# Patient Record
Sex: MALE | Race: WHITE | NOT HISPANIC OR LATINO | Employment: FULL TIME | URBAN - METROPOLITAN AREA
[De-identification: names, ages, dates, MRNs, and addresses within clinical notes are randomized per-mention and may not be internally consistent; named-entity substitution may affect disease eponyms.]

---

## 2022-10-11 ENCOUNTER — HOSPITAL ENCOUNTER (EMERGENCY)
Facility: CLINIC | Age: 52
Discharge: HOME | End: 2022-10-11
Attending: EMERGENCY MEDICINE | Admitting: EMERGENCY MEDICINE
Payer: COMMERCIAL

## 2022-10-11 VITALS
SYSTOLIC BLOOD PRESSURE: 118 MMHG | RESPIRATION RATE: 18 BRPM | HEART RATE: 83 BPM | TEMPERATURE: 97.7 F | DIASTOLIC BLOOD PRESSURE: 74 MMHG | OXYGEN SATURATION: 95 %

## 2022-10-11 DIAGNOSIS — F10.10 ALCOHOL ABUSE: ICD-10-CM

## 2022-10-11 LAB
ALBUMIN SERPL-MCNC: 3.4 G/DL (ref 3.4–5)
ALCOHOL BREATH TEST: 0.26 (ref 0–0.01)
ALP SERPL-CCNC: 102 U/L (ref 40–150)
ALT SERPL W P-5'-P-CCNC: 117 U/L (ref 0–70)
ANION GAP SERPL CALCULATED.3IONS-SCNC: 8 MMOL/L (ref 3–14)
AST SERPL W P-5'-P-CCNC: 189 U/L (ref 0–45)
BASOPHILS # BLD AUTO: 0 10E3/UL (ref 0–0.2)
BASOPHILS NFR BLD AUTO: 1 %
BILIRUB SERPL-MCNC: 0.5 MG/DL (ref 0.2–1.3)
BUN SERPL-MCNC: 10 MG/DL (ref 7–30)
CALCIUM SERPL-MCNC: 8 MG/DL (ref 8.5–10.1)
CHLORIDE BLD-SCNC: 103 MMOL/L (ref 94–109)
CO2 SERPL-SCNC: 25 MMOL/L (ref 20–32)
CREAT SERPL-MCNC: 0.7 MG/DL (ref 0.66–1.25)
EOSINOPHIL # BLD AUTO: 0 10E3/UL (ref 0–0.7)
EOSINOPHIL NFR BLD AUTO: 1 %
ERYTHROCYTE [DISTWIDTH] IN BLOOD BY AUTOMATED COUNT: 12.4 % (ref 10–15)
GFR SERPL CREATININE-BSD FRML MDRD: >90 ML/MIN/1.73M2
GLUCOSE BLD-MCNC: 99 MG/DL (ref 70–99)
HCT VFR BLD AUTO: 43.6 % (ref 40–53)
HGB BLD-MCNC: 15.2 G/DL (ref 13.3–17.7)
IMM GRANULOCYTES # BLD: 0 10E3/UL
IMM GRANULOCYTES NFR BLD: 0 %
LYMPHOCYTES # BLD AUTO: 1.7 10E3/UL (ref 0.8–5.3)
LYMPHOCYTES NFR BLD AUTO: 44 %
MCH RBC QN AUTO: 34 PG (ref 26.5–33)
MCHC RBC AUTO-ENTMCNC: 34.9 G/DL (ref 31.5–36.5)
MCV RBC AUTO: 98 FL (ref 78–100)
MONOCYTES # BLD AUTO: 0.4 10E3/UL (ref 0–1.3)
MONOCYTES NFR BLD AUTO: 10 %
NEUTROPHILS # BLD AUTO: 1.7 10E3/UL (ref 1.6–8.3)
NEUTROPHILS NFR BLD AUTO: 44 %
NRBC # BLD AUTO: 0 10E3/UL
NRBC BLD AUTO-RTO: 0 /100
PLATELET # BLD AUTO: 158 10E3/UL (ref 150–450)
POTASSIUM BLD-SCNC: 3.2 MMOL/L (ref 3.4–5.3)
PROT SERPL-MCNC: 7 G/DL (ref 6.8–8.8)
RBC # BLD AUTO: 4.47 10E6/UL (ref 4.4–5.9)
SARS-COV-2 RNA RESP QL NAA+PROBE: NEGATIVE
SODIUM SERPL-SCNC: 136 MMOL/L (ref 133–144)
WBC # BLD AUTO: 3.9 10E3/UL (ref 4–11)

## 2022-10-11 PROCEDURE — C9803 HOPD COVID-19 SPEC COLLECT: HCPCS | Performed by: EMERGENCY MEDICINE

## 2022-10-11 PROCEDURE — 99283 EMERGENCY DEPT VISIT LOW MDM: CPT | Performed by: EMERGENCY MEDICINE

## 2022-10-11 PROCEDURE — 80053 COMPREHEN METABOLIC PANEL: CPT | Performed by: EMERGENCY MEDICINE

## 2022-10-11 PROCEDURE — 36415 COLL VENOUS BLD VENIPUNCTURE: CPT | Performed by: EMERGENCY MEDICINE

## 2022-10-11 PROCEDURE — U0003 INFECTIOUS AGENT DETECTION BY NUCLEIC ACID (DNA OR RNA); SEVERE ACUTE RESPIRATORY SYNDROME CORONAVIRUS 2 (SARS-COV-2) (CORONAVIRUS DISEASE [COVID-19]), AMPLIFIED PROBE TECHNIQUE, MAKING USE OF HIGH THROUGHPUT TECHNOLOGIES AS DESCRIBED BY CMS-2020-01-R: HCPCS | Performed by: EMERGENCY MEDICINE

## 2022-10-11 PROCEDURE — 250N000013 HC RX MED GY IP 250 OP 250 PS 637: Performed by: EMERGENCY MEDICINE

## 2022-10-11 PROCEDURE — 82075 ASSAY OF BREATH ETHANOL: CPT | Performed by: EMERGENCY MEDICINE

## 2022-10-11 PROCEDURE — 85048 AUTOMATED LEUKOCYTE COUNT: CPT | Performed by: EMERGENCY MEDICINE

## 2022-10-11 PROCEDURE — 99284 EMERGENCY DEPT VISIT MOD MDM: CPT | Performed by: EMERGENCY MEDICINE

## 2022-10-11 RX ORDER — DIAZEPAM 5 MG
5-20 TABLET ORAL EVERY 30 MIN PRN
Status: DISCONTINUED | OUTPATIENT
Start: 2022-10-11 | End: 2022-10-11 | Stop reason: HOSPADM

## 2022-10-11 RX ORDER — POTASSIUM CHLORIDE 750 MG/1
40 TABLET, EXTENDED RELEASE ORAL ONCE
Status: COMPLETED | OUTPATIENT
Start: 2022-10-11 | End: 2022-10-11

## 2022-10-11 RX ORDER — FOLIC ACID 1 MG/1
1 TABLET ORAL ONCE
Status: COMPLETED | OUTPATIENT
Start: 2022-10-11 | End: 2022-10-11

## 2022-10-11 RX ORDER — MULTIPLE VITAMINS W/ MINERALS TAB 9MG-400MCG
1 TAB ORAL ONCE
Status: COMPLETED | OUTPATIENT
Start: 2022-10-11 | End: 2022-10-11

## 2022-10-11 RX ADMIN — FOLIC ACID 1 MG: 1 TABLET ORAL at 08:49

## 2022-10-11 RX ADMIN — THIAMINE HCL TAB 100 MG 100 MG: 100 TAB at 08:49

## 2022-10-11 RX ADMIN — POTASSIUM CHLORIDE 40 MEQ: 750 TABLET, EXTENDED RELEASE ORAL at 12:02

## 2022-10-11 RX ADMIN — MULTIPLE VITAMINS W/ MINERALS TAB 1 TABLET: TAB at 08:49

## 2022-10-11 ASSESSMENT — ENCOUNTER SYMPTOMS
ABDOMINAL PAIN: 0
VOMITING: 0
NAUSEA: 0

## 2022-10-11 ASSESSMENT — ACTIVITIES OF DAILY LIVING (ADL)
ADLS_ACUITY_SCORE: 35

## 2022-10-11 NOTE — DISCHARGE INSTRUCTIONS
DISCHARGE RESOURCES:  -SMART Recovery - self management for addiction recovery:  www.smartrecovery.org    -Pathways ~ A Health Crisis Resource & Support Center: 184.382.4738.  -[facility] [phone]   -[facility] Behavioral Intake [phone] or 360-020-9175.  -Crisis Intervention: 658.693.4783 or 409-753-5060 (TTY: 479.757.7248).  Call anytime.  -Suicide Awareness Voices of Education (SAVE) (www.save.org): 528-238-QEFW (1519)  -National Suicide Prevention Line (www.mentalhealthmn.org): 111-375-VECD (6754)  -National [place] on Mental Illness (www.mn.naresh.org): 394.520.6058 or 613-558-0560.  -Gbqk9aqpu: text the word LIFE to 83655 for immediate support and crisis intervention  -Mental Health Consumer/Survivor Network of [place] (www.mhcsn.net): 802.784.1289 or 520-952-1718  -Mental Health Association of [place] (www.mentalhealth.org): 635.690.4859 or 661-018-4872     -Substance Abuse and Mental Health Services (www.samhsa.gov)  -Harm Reduction Coalition (www. Harmreduction.org)  -www.prescribetoprevent.org or http://prescribetoprevent.org/video  -Poison control 4-978-599-1755   -[place] Opioid Prevention Coalition: www.opioidcoalition.org      Sober Support Group Information:  AA/NA & Sponsor/Support  -Alcoholics Anonymous (www.alcoholics-anonymous.org): for local information 24 hours/day  -AA Intergroup service office in [place] (http://www.aastpaul.org/) 408.590.9243  -AA Intergroup service office in [facility]: [phone]. (http://www.a[place].org/)  -Narcotics Anonymous (www.nam[place].org) (753) 644-1512   -Sober Fun Activities: www.soberDindongactivities.[employer]/[facility]//[facility] Recovery Connection (MRC)  [facility] connects people seeking recovery to resources that help foster and sustain long-term recovery.  Whether you are seeking resources for treatment, transportation, housing, job training, education, health care or other pathways to recovery, [facility] is  a great place to start.    Phone: [phone]. www.[place]ade.[employer] (Great listing of all types of recovery and non-recovery related resources)      General Medication Instructions:   See your medication sheet(s) for instructions.   Take all medicines as directed.  Make no changes unless your doctor suggests them.   Go to all your doctor visits.  Be sure to have all your required lab tests. This way, your medicines can be refilled on time.  Do not use any drugs not prescribed by your provider.  AA/NA and Sponsors are excellent resources for support  Avoid alcohol.      Any follow up concerns:  Nursing questions call the [facility] [phone]  Medical Record call [phone]  Outpatient Behavioral Intake call [phone]  LP+ Wait List/Bed Availability call [phone]

## 2022-10-11 NOTE — ED TRIAGE NOTES
"Pt seeking detox. Pt drinks aprox 6 hard seltzers, and a \"few\" hard liquor drinks a day. Last drink last night. No hx seizures with withdrawal. Pt states increase in stress due to recently losing his job, and his father passing away last week.      Triage Assessment     Row Name 10/11/22 0808       Triage Assessment (Adult)    Airway WDL WDL       Respiratory WDL    Respiratory WDL WDL       Skin Circulation/Temperature WDL    Skin Circulation/Temperature WDL WDL       Cardiac WDL    Cardiac WDL WDL       Peripheral/Neurovascular WDL    Peripheral Neurovascular WDL WDL       Cognitive/Neuro/Behavioral WDL    Cognitive/Neuro/Behavioral WDL WDL              "

## 2022-10-11 NOTE — ED PROVIDER NOTES
"    [facility] EMERGENCY DEPARTMENT ([facility])    10/11/22     [facility] 3  10:25 AM   History     Chief Complaint   Patient presents with     Alcohol Intoxication     Pt seeking detox. Pt drinks aprox 6 hard seltzers, and a \"few\" hard liquor drinks a day. Last drink last night. No hx seizures with withdrawal.     The history is provided by medical records and the patient.     Darion Castrejon is a 52 year old male who presents with alcohol intoxication.  He reports drinking 6 hard seltzers and a few hard liquor drinks a day, last drink last night.  No history of alcohol withdrawal seizures.  He notes having multiple stressors including the passing of his father 7 days ago and losing his job. He states his best friend is a social work told him to drink because she wanted to bring him for detox last night. He states he has been drinking alcohol daily for the past 3 years, but has been cutting back from 5-7 days earlier when his father . He drinks 6-7 hard seltzers a day like [place], sometimes \"graduates\" to vodka cocktails. He drank at her insistence. No history of alcohol withdrawal seizures or DTs. He states his sons and work have been telling him that he has a drinking problem and he acknowledges this. His friend has been very concerned about his health and his drinking for years. He notes having multiple resources that he can go to. He states he occasionally has a couple  where he partied hard on weekend and was feeling symptomatic on a Monday. No nausea, vomiting, diaphoresis, tachycardia. Job is in jeopardy because he took time off to be with his father through hospice. Was supposed to go into work today but lost his phone and couldn't call in. He denies any depression, anxiety, suicidal or homicidal ideation. Also uses marijuana. Just wants to be discharged to follow-up with outpatient resources. He is a single parent of 2 children and his job is important to him.  He has been there for 6 " years and has benefits for his kids.       Past Medical History  No past medical history on file.  No past surgical history on file.  No current outpatient medications on file.    No Known Allergies  Family History  No family history on file.  Social History       Past medical history, past surgical history, medications, allergies, family history, and social history were reviewed with the patient. No additional pertinent items.       Review of Systems   Gastrointestinal: Negative for abdominal pain, nausea and vomiting.   Psychiatric/Behavioral: Negative for suicidal ideas.   All other systems reviewed and are negative.    A complete review of systems was performed with pertinent positives and negatives noted in the HPI, and all other systems negative.    Physical Exam   BP: (!) 142/91  Pulse: 89  Temp: 97.7  F (36.5  C)  Resp: 18  SpO2: 98 %  Physical Exam  Vitals and nursing note reviewed.   Constitutional:       General: He is not in acute distress.     Appearance: Normal appearance. He is well-developed and well-nourished. He is not ill-appearing or diaphoretic.   HENT:      Head: Normocephalic and atraumatic.      Nose: Nose normal.      Mouth/Throat:      Mouth: Mucous membranes are moist.   Eyes:      General: No scleral icterus.     Conjunctiva/sclera: Conjunctivae normal.   Cardiovascular:      Rate and Rhythm: Normal rate.   Pulmonary:      Effort: Pulmonary effort is normal. No respiratory distress.      Breath sounds: No stridor.   Abdominal:      General: There is no distension.   Musculoskeletal:         General: No deformity or signs of injury. Normal range of motion.      Cervical back: Normal range of motion and neck supple. No rigidity.   Skin:     General: Skin is warm and dry.      Coloration: Skin is not jaundiced or pale.      Findings: No rash.   Neurological:      General: No focal deficit present.      Mental Status: He is alert and oriented to person, place, and time.      GCS: GCS eye  subscore is 4. GCS verbal subscore is 5. GCS motor subscore is 6.      Cranial Nerves: No dysarthria or facial asymmetry.      Motor: No tremor.      Gait: Gait is intact.   Psychiatric:         Attention and Perception: Attention normal.         Mood and Affect: Mood and affect, mood and affect normal. Mood is not anxious.         Speech: Speech normal. Speech is not slurred.         Behavior: Behavior normal. Behavior is not slowed. Behavior is cooperative.         Thought Content: Thought content normal. Thought content does not include homicidal or suicidal ideation.         ED Course      Procedures                     Results for orders placed or performed during the hospital encounter of 10/11/22   Comprehensive metabolic panel     Status: Abnormal   Result Value Ref Range    Sodium 136 133 - 144 mmol/L    Potassium 3.2 (L) 3.4 - 5.3 mmol/L    Chloride 103 94 - 109 mmol/L    Carbon Dioxide (CO2) 25 20 - 32 mmol/L    Anion Gap 8 3 - 14 mmol/L    Urea Nitrogen 10 7 - 30 mg/dL    Creatinine 0.70 0.66 - 1.25 mg/dL    Calcium 8.0 (L) 8.5 - 10.1 mg/dL    Glucose 99 70 - 99 mg/dL    Alkaline Phosphatase 102 40 - 150 U/L     (H) 0 - 45 U/L     (H) 0 - 70 U/L    Protein Total 7.0 6.8 - 8.8 g/dL    Albumin 3.4 3.4 - 5.0 g/dL    Bilirubin Total 0.5 0.2 - 1.3 mg/dL    GFR Estimate >90 >60 mL/min/1.73m2   Asymptomatic COVID-19 Virus (Coronavirus) by PCR Nasopharyngeal     Status: Normal    Specimen: Nasopharyngeal; Swab   Result Value Ref Range    SARS CoV2 PCR Negative Negative    Narrative    Testing was performed using the Xpert Xpress SARS-CoV-2 Assay on the   [employer]. Additional information about   this Emergency Use Authorization (EUA) assay can be found via the Lab   Guide. This test should be ordered for the detection of SARS-CoV-2 in   individuals who meet SARS-CoV-2 clinical and/or epidemiological   criteria. Test performance is unknown in asymptomatic patients. This    test is for in vitro diagnostic use under the FDA EUA for   laboratories certified under CLIA to perform high complexity testing.   This test has not been FDA cleared or approved. A negative result   does not rule out the presence of PCR inhibitors in the specimen or   target RNA in concentration below the limit of detection for the   assay. The possibility of a false negative should be considered if   the patient's recent exposure or clinical presentation suggests   COVID-19. This test was validated by the [facility]. This laboratory is certified under the Clinical Laboratory Improvement Amendments of 1988 (CLIA-88) as qualified to perform high complexity laboratory testing.     CBC with platelets and differential     Status: Abnormal   Result Value Ref Range    WBC Count 3.9 (L) 4.0 - 11.0 10e3/uL    RBC Count 4.47 4.40 - 5.90 10e6/uL    Hemoglobin 15.2 13.3 - 17.7 g/dL    Hematocrit 43.6 40.0 - 53.0 %    MCV 98 78 - 100 fL    MCH 34.0 (H) 26.5 - 33.0 pg    MCHC 34.9 31.5 - 36.5 g/dL    RDW 12.4 10.0 - 15.0 %    Platelet Count 158 150 - 450 10e3/uL    % Neutrophils 44 %    % Lymphocytes 44 %    % Monocytes 10 %    % Eosinophils 1 %    % Basophils 1 %    % Immature Granulocytes 0 %    NRBCs per 100 WBC 0 <1 /100    Absolute Neutrophils 1.7 1.6 - 8.3 10e3/uL    Absolute Lymphocytes 1.7 0.8 - 5.3 10e3/uL    Absolute Monocytes 0.4 0.0 - 1.3 10e3/uL    Absolute Eosinophils 0.0 0.0 - 0.7 10e3/uL    Absolute Basophils 0.0 0.0 - 0.2 10e3/uL    Absolute Immature Granulocytes 0.0 <=0.4 10e3/uL    Absolute NRBCs 0.0 10e3/uL   Alcohol breath test POCT     Status: Abnormal   Result Value Ref Range    Alcohol Breath Test 0.257 (A) 0.00 - 0.01   CBC with platelets differential     Status: Abnormal    Narrative    The following orders were created for panel order CBC with platelets differential.  Procedure                               Abnormality         Status                     ---------                                -----------         ------                     CBC with platelets and d...[[id number]]  Abnormal            Final result                 Please view results for these tests on the individual orders.   Urine Drugs of Abuse Screen     Status: None ()    Narrative    The following orders were created for panel order Urine Drugs of Abuse Screen.  Procedure                               Abnormality         Status                     ---------                               -----------         ------                     Drug abuse screen 1 urin...[[id number]]                                                   Please view results for these tests on the individual orders.     Medications   thiamine (B-1) tablet 100 mg (100 mg Oral Given 10/11/22 0849)   folic acid (FOLVITE) tablet 1 mg (1 mg Oral Given 10/11/22 0849)   multivitamin w/minerals (THERA-VIT-M) tablet 1 tablet (1 tablet Oral Given 10/11/22 0849)   potassium chloride ER (KLOR-CON M) CR tablet 40 mEq (40 mEq Oral Given 10/11/22 1202)        Assessments & Plan (with Medical Decision Making)   Darion Castrejon is a 52 year old male who presents with alcohol intoxication.     Ddx: Alcohol withdrawal, alcohol abuse, alcohol intoxication acute bereavement    Patient referred to our emergency department by a friend who wanted him to be admitted for alcohol detox.  However, the patient does not have any alcohol withdrawal symptoms when he stops drinking.  He has never had alcohol withdrawal seizure or delirium.  He denies nausea, vomiting, tremor, diaphoresis, anxiety.  He has no suicidal or homicidal ideation and is motivated to stop drinking utilizing outpatient resources for alcohol abuse.  He states that his job offers the treatment program which he has access to and intends to follow through with.  He does not necessarily want to be admitted for detox.  After this discussion, it is apparent he does not meet clinical criteria to be admitted to  our detox unit as he does not have evidence of physical dependence or withdrawal.  I did discuss in detail with the patient that I think it is important for him to utilize his outpatient resources to treat his alcohol abuse and addiction despite lack of physical withdrawal symptoms.  Patient clinically sober and discharged with a sober ride.    I have reviewed the nursing notes. I have reviewed the findings, diagnosis, plan and need for follow up with the patient.    There are no discharge medications for this patient.      Final diagnoses:   Alcohol abuse       --  Monika SANTOS [facility] EMERGENCY DEPARTMENT  10/11/2022     Monika Peng MD  10/11/22 7021

## 2023-08-04 ENCOUNTER — HOSPITAL ENCOUNTER (INPATIENT)
Facility: CLINIC | Age: 53
LOS: 3 days | Discharge: HOME | End: 2023-08-07
Attending: STUDENT IN AN ORGANIZED HEALTH CARE EDUCATION/TRAINING PROGRAM | Admitting: PSYCHIATRY & NEUROLOGY
Payer: MEDICAID

## 2023-08-04 DIAGNOSIS — Z11.52 ENCOUNTER FOR SCREENING LABORATORY TESTING FOR SEVERE ACUTE RESPIRATORY SYNDROME CORONAVIRUS 2 (SARS-COV-2): Primary | ICD-10-CM

## 2023-08-04 DIAGNOSIS — E87.1 HYPONATREMIA: ICD-10-CM

## 2023-08-04 DIAGNOSIS — E87.8 HYPOCHLOREMIA: ICD-10-CM

## 2023-08-04 DIAGNOSIS — F10.939 ALCOHOL WITHDRAWAL, WITH UNSPECIFIED COMPLICATION (H): ICD-10-CM

## 2023-08-04 DIAGNOSIS — R74.01 TRANSAMINITIS: ICD-10-CM

## 2023-08-04 DIAGNOSIS — R17 ELEVATED BILIRUBIN: ICD-10-CM

## 2023-08-04 DIAGNOSIS — F10.930 ALCOHOL WITHDRAWAL, UNCOMPLICATED (H): ICD-10-CM

## 2023-08-04 LAB
ALBUMIN SERPL BCG-MCNC: 3.8 G/DL (ref 3.5–5.2)
ALP SERPL-CCNC: 148 U/L (ref 40–129)
ALT SERPL W P-5'-P-CCNC: 80 U/L (ref 0–70)
AMPHETAMINES UR QL SCN: ABNORMAL
ANION GAP SERPL CALCULATED.3IONS-SCNC: 11 MMOL/L (ref 7–15)
AST SERPL W P-5'-P-CCNC: 159 U/L (ref 0–45)
BARBITURATES UR QL SCN: ABNORMAL
BASOPHILS # BLD AUTO: 0 10E3/UL (ref 0–0.2)
BASOPHILS NFR BLD AUTO: 0 %
BENZODIAZ UR QL SCN: ABNORMAL
BILIRUB SERPL-MCNC: 1.8 MG/DL
BUN SERPL-MCNC: 6.7 MG/DL (ref 6–20)
BZE UR QL SCN: ABNORMAL
CALCIUM SERPL-MCNC: 9.1 MG/DL (ref 8.6–10)
CANNABINOIDS UR QL SCN: ABNORMAL
CHLORIDE SERPL-SCNC: 92 MMOL/L (ref 98–107)
CREAT SERPL-MCNC: 0.86 MG/DL (ref 0.67–1.17)
DEPRECATED HCO3 PLAS-SCNC: 27 MMOL/L (ref 22–29)
EOSINOPHIL # BLD AUTO: 0 10E3/UL (ref 0–0.7)
EOSINOPHIL NFR BLD AUTO: 0 %
ERYTHROCYTE [DISTWIDTH] IN BLOOD BY AUTOMATED COUNT: 12.7 % (ref 10–15)
ETHANOL SERPL-MCNC: <0.01 G/DL
FENTANYL UR QL: NORMAL
GFR SERPL CREATININE-BSD FRML MDRD: >90 ML/MIN/1.73M2
GLUCOSE SERPL-MCNC: 129 MG/DL (ref 70–99)
HCT VFR BLD AUTO: 42.7 % (ref 40–53)
HGB BLD-MCNC: 15 G/DL (ref 13.3–17.7)
IMM GRANULOCYTES # BLD: 0 10E3/UL
IMM GRANULOCYTES NFR BLD: 0 %
LYMPHOCYTES # BLD AUTO: 0.6 10E3/UL (ref 0.8–5.3)
LYMPHOCYTES NFR BLD AUTO: 11 %
MAGNESIUM SERPL-MCNC: 1.6 MG/DL (ref 1.7–2.3)
MCH RBC QN AUTO: 34.1 PG (ref 26.5–33)
MCHC RBC AUTO-ENTMCNC: 35.1 G/DL (ref 31.5–36.5)
MCV RBC AUTO: 97 FL (ref 78–100)
MONOCYTES # BLD AUTO: 0.4 10E3/UL (ref 0–1.3)
MONOCYTES NFR BLD AUTO: 8 %
NEUTROPHILS # BLD AUTO: 4.2 10E3/UL (ref 1.6–8.3)
NEUTROPHILS NFR BLD AUTO: 81 %
NRBC # BLD AUTO: 0 10E3/UL
NRBC BLD AUTO-RTO: 0 /100
OPIATES UR QL SCN: ABNORMAL
PLATELET # BLD AUTO: 185 10E3/UL (ref 150–450)
POTASSIUM SERPL-SCNC: 4.9 MMOL/L (ref 3.4–5.3)
PROT SERPL-MCNC: 6.9 G/DL (ref 6.4–8.3)
RBC # BLD AUTO: 4.4 10E6/UL (ref 4.4–5.9)
SARS-COV-2 RNA RESP QL NAA+PROBE: NEGATIVE
SODIUM SERPL-SCNC: 130 MMOL/L (ref 136–145)
WBC # BLD AUTO: 5.3 10E3/UL (ref 4–11)

## 2023-08-04 PROCEDURE — 128N000004 HC R&B CD ADULT

## 2023-08-04 PROCEDURE — 99285 EMERGENCY DEPT VISIT HI MDM: CPT | Mod: 25

## 2023-08-04 PROCEDURE — 250N000013 HC RX MED GY IP 250 OP 250 PS 637: Performed by: EMERGENCY MEDICINE

## 2023-08-04 PROCEDURE — 85025 COMPLETE CBC W/AUTO DIFF WBC: CPT | Performed by: STUDENT IN AN ORGANIZED HEALTH CARE EDUCATION/TRAINING PROGRAM

## 2023-08-04 PROCEDURE — 80307 DRUG TEST PRSMV CHEM ANLYZR: CPT | Performed by: STUDENT IN AN ORGANIZED HEALTH CARE EDUCATION/TRAINING PROGRAM

## 2023-08-04 PROCEDURE — 250N000013 HC RX MED GY IP 250 OP 250 PS 637: Performed by: STUDENT IN AN ORGANIZED HEALTH CARE EDUCATION/TRAINING PROGRAM

## 2023-08-04 PROCEDURE — 87635 SARS-COV-2 COVID-19 AMP PRB: CPT | Performed by: STUDENT IN AN ORGANIZED HEALTH CARE EDUCATION/TRAINING PROGRAM

## 2023-08-04 PROCEDURE — 36415 COLL VENOUS BLD VENIPUNCTURE: CPT | Performed by: STUDENT IN AN ORGANIZED HEALTH CARE EDUCATION/TRAINING PROGRAM

## 2023-08-04 PROCEDURE — 82077 ASSAY SPEC XCP UR&BREATH IA: CPT | Performed by: STUDENT IN AN ORGANIZED HEALTH CARE EDUCATION/TRAINING PROGRAM

## 2023-08-04 PROCEDURE — 80053 COMPREHEN METABOLIC PANEL: CPT | Performed by: STUDENT IN AN ORGANIZED HEALTH CARE EDUCATION/TRAINING PROGRAM

## 2023-08-04 PROCEDURE — 83735 ASSAY OF MAGNESIUM: CPT | Performed by: STUDENT IN AN ORGANIZED HEALTH CARE EDUCATION/TRAINING PROGRAM

## 2023-08-04 PROCEDURE — 99285 EMERGENCY DEPT VISIT HI MDM: CPT | Performed by: STUDENT IN AN ORGANIZED HEALTH CARE EDUCATION/TRAINING PROGRAM

## 2023-08-04 PROCEDURE — HZ2ZZZZ DETOXIFICATION SERVICES FOR SUBSTANCE ABUSE TREATMENT: ICD-10-PCS | Performed by: PSYCHIATRY & NEUROLOGY

## 2023-08-04 PROCEDURE — 250N000013 HC RX MED GY IP 250 OP 250 PS 637: Performed by: PSYCHIATRY & NEUROLOGY

## 2023-08-04 RX ORDER — LOPERAMIDE HCL 2 MG
2 CAPSULE ORAL 4 TIMES DAILY PRN
Status: DISCONTINUED | OUTPATIENT
Start: 2023-08-04 | End: 2023-08-07 | Stop reason: HOSPADM

## 2023-08-04 RX ORDER — IBUPROFEN 600 MG/1
600 TABLET, FILM COATED ORAL EVERY 6 HOURS PRN
Status: DISCONTINUED | OUTPATIENT
Start: 2023-08-04 | End: 2023-08-07 | Stop reason: HOSPADM

## 2023-08-04 RX ORDER — FOLIC ACID 1 MG/1
1 TABLET ORAL DAILY
Status: DISCONTINUED | OUTPATIENT
Start: 2023-08-04 | End: 2023-08-07 | Stop reason: HOSPADM

## 2023-08-04 RX ORDER — CLONIDINE HYDROCHLORIDE 0.1 MG/1
0.1 TABLET ORAL ONCE
Status: COMPLETED | OUTPATIENT
Start: 2023-08-04 | End: 2023-08-04

## 2023-08-04 RX ORDER — MULTIPLE VITAMINS W/ MINERALS TAB 9MG-400MCG
1 TAB ORAL DAILY
Status: DISCONTINUED | OUTPATIENT
Start: 2023-08-04 | End: 2023-08-07 | Stop reason: HOSPADM

## 2023-08-04 RX ORDER — AMOXICILLIN 250 MG
1 CAPSULE ORAL 2 TIMES DAILY PRN
Status: DISCONTINUED | OUTPATIENT
Start: 2023-08-04 | End: 2023-08-07 | Stop reason: HOSPADM

## 2023-08-04 RX ORDER — ONDANSETRON 4 MG/1
4 TABLET, ORALLY DISINTEGRATING ORAL EVERY 6 HOURS PRN
Status: DISCONTINUED | OUTPATIENT
Start: 2023-08-04 | End: 2023-08-07 | Stop reason: HOSPADM

## 2023-08-04 RX ORDER — TRAZODONE HYDROCHLORIDE 50 MG/1
50 TABLET, FILM COATED ORAL
Status: DISCONTINUED | OUTPATIENT
Start: 2023-08-04 | End: 2023-08-07 | Stop reason: HOSPADM

## 2023-08-04 RX ORDER — MAGNESIUM HYDROXIDE/ALUMINUM HYDROXICE/SIMETHICONE 120; 1200; 1200 MG/30ML; MG/30ML; MG/30ML
30 SUSPENSION ORAL EVERY 4 HOURS PRN
Status: DISCONTINUED | OUTPATIENT
Start: 2023-08-04 | End: 2023-08-07 | Stop reason: HOSPADM

## 2023-08-04 RX ORDER — DIAZEPAM 5 MG
5-20 TABLET ORAL EVERY 30 MIN PRN
Status: DISCONTINUED | OUTPATIENT
Start: 2023-08-04 | End: 2023-08-07 | Stop reason: HOSPADM

## 2023-08-04 RX ORDER — LANOLIN ALCOHOL/MO/W.PET/CERES
3 CREAM (GRAM) TOPICAL
COMMUNITY

## 2023-08-04 RX ORDER — DIAZEPAM 10 MG
10 TABLET ORAL ONCE
Status: COMPLETED | OUTPATIENT
Start: 2023-08-04 | End: 2023-08-04

## 2023-08-04 RX ORDER — HYDROXYZINE HYDROCHLORIDE 25 MG/1
25 TABLET, FILM COATED ORAL EVERY 4 HOURS PRN
Status: DISCONTINUED | OUTPATIENT
Start: 2023-08-04 | End: 2023-08-07 | Stop reason: HOSPADM

## 2023-08-04 RX ADMIN — MULTIPLE VITAMINS W/ MINERALS TAB 1 TABLET: TAB at 08:45

## 2023-08-04 RX ADMIN — DIAZEPAM 10 MG: 10 TABLET ORAL at 08:45

## 2023-08-04 RX ADMIN — THIAMINE HCL TAB 100 MG 100 MG: 100 TAB at 08:45

## 2023-08-04 RX ADMIN — TRAZODONE HYDROCHLORIDE 50 MG: 50 TABLET ORAL at 22:24

## 2023-08-04 RX ADMIN — DIAZEPAM 5 MG: 5 TABLET ORAL at 13:50

## 2023-08-04 RX ADMIN — DIAZEPAM 10 MG: 5 TABLET ORAL at 20:08

## 2023-08-04 RX ADMIN — CLONIDINE HYDROCHLORIDE 0.1 MG: 0.1 TABLET ORAL at 19:09

## 2023-08-04 RX ADMIN — DIAZEPAM 10 MG: 5 TABLET ORAL at 17:24

## 2023-08-04 RX ADMIN — FOLIC ACID 1 MG: 1 TABLET ORAL at 08:45

## 2023-08-04 ASSESSMENT — ACTIVITIES OF DAILY LIVING (ADL)
ADLS_ACUITY_SCORE: 35
ADLS_ACUITY_SCORE: 35
WEAR_GLASSES_OR_BLIND: NO
ADLS_ACUITY_SCORE: 35
HYGIENE/GROOMING: INDEPENDENT
DRESS: INDEPENDENT
ADLS_ACUITY_SCORE: 35
ADLS_ACUITY_SCORE: 35
CONCENTRATING,_REMEMBERING_OR_MAKING_DECISIONS_DIFFICULTY: NO
WALKING_OR_CLIMBING_STAIRS_DIFFICULTY: NO
ORAL_HYGIENE: INDEPENDENT
LAUNDRY: UNABLE TO COMPLETE
DIFFICULTY_EATING/SWALLOWING: NO
ADLS_ACUITY_SCORE: 28
DRESSING/BATHING_DIFFICULTY: NO
TOILETING_ISSUES: NO
ADLS_ACUITY_SCORE: 35
ADLS_ACUITY_SCORE: 45

## 2023-08-04 ASSESSMENT — LIFESTYLE VARIABLES
AUDIT-C TOTAL SCORE: 11
SKIP TO QUESTIONS 9-10: 0

## 2023-08-04 NOTE — ED PROVIDER NOTES
"    [facility] [place]  8/04/23  ED 04      History     Chief Complaint   Patient presents with    Addiction Problem     Seeking detox for alcohol abuse     HPI  Darion Castrejon is a 53 year old male who presents to the Emergency Department for evaluation of an alcohol problem.  Patient states he has been drinking 6-10 beers daily for the past 10 months.  Previously was drinking hard liquor.  Had stopped drinking hard liquor after the sudden death of his father and best friend from cancer within close temporal proximity.  Has been unable to successfully stop drinking on his own due to withdrawal symptoms.  Denies history of seizures or DTs.  Denies other substances beyond marijuana.  Has not had any successful attempts at sobriety in the past.  Gets withdrawal symptoms such as shakes and tremors.  He denies acute medical concerns such as chest pain shortness of breath fever cough abdominal pain.      Past Medical History  History reviewed. No pertinent past medical history.  History reviewed. No pertinent surgical history.  melatonin 3 MG tablet      No Known Allergies  Family History  History reviewed. No pertinent family history.  Social History   Social History     Tobacco Use    Smoking status: Never    Smokeless tobacco: Former   Substance Use Topics    Alcohol use: Yes     Comment: 10 beers per day    Drug use: Yes     Types: Marijuana      Past medical history, past surgical history, medications, allergies, family history, and social history were reviewed with the patient. No additional pertinent items.      A medically appropriate review of systems was performed with pertinent positives and negatives noted in the HPI, and all other systems negative.    Physical Exam   BP: (!) 160/108  Pulse: 75  Temp: 98.4  F (36.9  C)  Resp: 18  Height: 175.3 cm (5' 9\")  Weight: 70.3 kg (155 lb)  SpO2: 100 %  Physical Exam  Vital Signs Reviewed  Gen: Well nourished, well developed, restless and " uncomfortable  HEENT: NC/AT, PERRL, EOMI, MMM  Neck: Supple, FROM  CV: Regular Rate, no murmur/rub/gallop  Lungs/Chest: Normal Effort, CTAB  Abd: Non-distended, non-tender  MSK/Back: FROM, no visible deformity  Neuro: A&Ox3, GCS 15, CN II-XII unremarkable tremors and tongue fasciculations.  Grossly nonfocal  Skin: Warm, Dry, Intact, no visible lesions    ED Course, Procedures, & Data      Seen and evaluated in ED room  [facility] alcohol withdrawal protocol initiated  Detox clearance labs obtained and reviewed  Staffed and accepted for detox.    Procedures                   Results for orders placed or performed during the hospital encounter of 08/04/23   Comprehensive metabolic panel     Status: Abnormal   Result Value Ref Range    Sodium 130 (L) 136 - 145 mmol/L    Potassium 4.9 3.4 - 5.3 mmol/L    Chloride 92 (L) 98 - 107 mmol/L    Carbon Dioxide (CO2) 27 22 - 29 mmol/L    Anion Gap 11 7 - 15 mmol/L    Urea Nitrogen 6.7 6.0 - 20.0 mg/dL    Creatinine 0.86 0.67 - 1.17 mg/dL    Calcium 9.1 8.6 - 10.0 mg/dL    Glucose 129 (H) 70 - 99 mg/dL    Alkaline Phosphatase 148 (H) 40 - 129 U/L     (H) 0 - 45 U/L    ALT 80 (H) 0 - 70 U/L    Protein Total 6.9 6.4 - 8.3 g/dL    Albumin 3.8 3.5 - 5.2 g/dL    Bilirubin Total 1.8 (H) <=1.2 mg/dL    GFR Estimate >90 >60 mL/min/1.73m2   Magnesium     Status: Abnormal   Result Value Ref Range    Magnesium 1.6 (L) 1.7 - 2.3 mg/dL   Fentanyl Qual Urine     Status: Normal   Result Value Ref Range    Fentanyl Qual Urine Screen Negative Screen Negative   Ethyl Alcohol Level     Status: Normal   Result Value Ref Range    Alcohol ethyl <0.01 <=0.01 g/dL   Asymptomatic COVID-19 Virus (Coronavirus) by PCR Nasopharyngeal     Status: Normal    Specimen: Nasopharyngeal; Swab   Result Value Ref Range    SARS CoV2 PCR Negative Negative    Narrative    Testing was performed using the Xpert Xpress SARS-CoV-2 Assay on the Cepheid Gene-Xpert Instrument Systems. Additional information about this  Emergency Use Authorization (EUA) assay can be found via the Lab Guide. This test should be ordered for the detection of SARS-CoV-2 in individuals who meet SARS-CoV-2 clinical and/or epidemiological criteria as well as from individuals without symptoms or other reasons to suspect COVID-19. Test performance for asymptomatic patients has only been established in anterior nasal swab specimens. This test is for in vitro diagnostic use under the FDA EUA for laboratories certified under CLIA to perform high complexity testing. This test has not been FDA cleared or approved. A negative result does not rule out the presence of PCR inhibitors in the specimen or target RNA concentration below the limit of detection for the assay. The possibility of a false negative should be considered if the patient's recent exposure or clinical presentation suggests COVID-19. This test was validated by the [facility]. This laboratory is certified under the Clinical Laboratory Improvement Amendments (CLIA) as qualified to perform high complexity laboratory testing.     CBC with platelets and differential     Status: Abnormal   Result Value Ref Range    WBC Count 5.3 4.0 - 11.0 10e3/uL    RBC Count 4.40 4.40 - 5.90 10e6/uL    Hemoglobin 15.0 13.3 - 17.7 g/dL    Hematocrit 42.7 40.0 - 53.0 %    MCV 97 78 - 100 fL    MCH 34.1 (H) 26.5 - 33.0 pg    MCHC 35.1 31.5 - 36.5 g/dL    RDW 12.7 10.0 - 15.0 %    Platelet Count 185 150 - 450 10e3/uL    % Neutrophils 81 %    % Lymphocytes 11 %    % Monocytes 8 %    % Eosinophils 0 %    % Basophils 0 %    % Immature Granulocytes 0 %    NRBCs per 100 WBC 0 <1 /100    Absolute Neutrophils 4.2 1.6 - 8.3 10e3/uL    Absolute Lymphocytes 0.6 (L) 0.8 - 5.3 10e3/uL    Absolute Monocytes 0.4 0.0 - 1.3 10e3/uL    Absolute Eosinophils 0.0 0.0 - 0.7 10e3/uL    Absolute Basophils 0.0 0.0 - 0.2 10e3/uL    Absolute Immature Granulocytes 0.0 <=0.4 10e3/uL    Absolute NRBCs 0.0 10e3/uL    Drug abuse screen 1 urine (ED)     Status: Abnormal   Result Value Ref Range    Amphetamines Urine Screen Negative Screen Negative    Barbituates Urine Screen Negative Screen Negative    Benzodiazepine Urine Screen Negative Screen Negative    Cannabinoids Urine Screen Positive (A) Screen Negative    Cocaine Urine Screen Negative Screen Negative    Opiates Urine Screen Negative Screen Negative   CBC with platelets differential     Status: Abnormal    Narrative    The following orders were created for panel order CBC with platelets differential.  Procedure                               Abnormality         Status                     ---------                               -----------         ------                     CBC with platelets and d...[[id number]]  Abnormal            Final result                 Please view results for these tests on the individual orders.   Urine Drugs of Abuse Screen     Status: Abnormal    Narrative    The following orders were created for panel order Urine Drugs of Abuse Screen.  Procedure                               Abnormality         Status                     ---------                               -----------         ------                     Drug abuse screen 1 urin...[[id number]]  Abnormal            Final result                 Please view results for these tests on the individual orders.     Medications   diazepam (VALIUM) tablet 5-20 mg (5 mg Oral $Given 8/4/23 1350)   thiamine (B-1) tablet 100 mg (100 mg Oral $Given 8/4/23 0845)   folic acid (FOLVITE) tablet 1 mg (1 mg Oral $Given 8/4/23 0845)   multivitamin w/minerals (THERA-VIT-M) tablet 1 tablet (1 tablet Oral $Given 8/4/23 0845)   ondansetron (ZOFRAN ODT) ODT tab 4 mg (has no administration in time range)   diazepam (VALIUM) tablet 10 mg (10 mg Oral $Given 8/4/23 0845)     Labs Ordered and Resulted from Time of ED Arrival to Time of ED Departure   COMPREHENSIVE METABOLIC PANEL - Abnormal       Result Value    Sodium 130  (*)     Potassium 4.9      Chloride 92 (*)     Carbon Dioxide (CO2) 27      Anion Gap 11      Urea Nitrogen 6.7      Creatinine 0.86      Calcium 9.1      Glucose 129 (*)     Alkaline Phosphatase 148 (*)      (*)     ALT 80 (*)     Protein Total 6.9      Albumin 3.8      Bilirubin Total 1.8 (*)     GFR Estimate >90     MAGNESIUM - Abnormal    Magnesium 1.6 (*)    CBC WITH PLATELETS AND DIFFERENTIAL - Abnormal    WBC Count 5.3      RBC Count 4.40      Hemoglobin 15.0      Hematocrit 42.7      MCV 97      MCH 34.1 (*)     MCHC 35.1      RDW 12.7      Platelet Count 185      % Neutrophils 81      % Lymphocytes 11      % Monocytes 8      % Eosinophils 0      % Basophils 0      % Immature Granulocytes 0      NRBCs per 100 WBC 0      Absolute Neutrophils 4.2      Absolute Lymphocytes 0.6 (*)     Absolute Monocytes 0.4      Absolute Eosinophils 0.0      Absolute Basophils 0.0      Absolute Immature Granulocytes 0.0      Absolute NRBCs 0.0     DRUG ABUSE SCREEN 1 URINE (ED) - Abnormal    Amphetamines Urine Screen Negative      Barbituates Urine Screen Negative      Benzodiazepine Urine Screen Negative      Cannabinoids Urine Screen Positive (*)     Cocaine Urine Screen Negative      Opiates Urine Screen Negative     FENTANYL URINE, QUALITATIVE - Normal    Fentanyl Qual Urine Screen Negative     ETHYL ALCOHOL LEVEL - Normal    Alcohol ethyl <0.01     COVID-19 VIRUS (CORONAVIRUS) BY PCR - Normal    SARS CoV2 PCR Negative       No orders to display          Critical care was not performed.     Medical Decision Making  The patient's presentation was of high complexity (an acute health issue posing potential threat to life or bodily function).    The patient's evaluation involved:  ordering and/or review of 3+ test(s) in this encounter (see separate area of note for details)  discussion of management or test interpretation with another health professional (Detox)    The patient's management necessitated high risk (drug  therapy requiring intensive monitoring (see separate area of note for details)) and high risk (a decision regarding hospitalization).    Assessment & Plan    Darion Castrejon is a 53 year old male who presents to the Emergency Department for evaluation of an alcohol problem.  On arrival patient had reassuring vital signs aside from some slightly elevated blood pressure.  Withdrawal symptoms have progressed and he is on withdrawal protocol.  Remains appropriate for oral benzodiazepines on the detox unit.  Admission to the detox unit was delayed due to some unforeseen physical plant issues.  Patient has been accepted to that unit.  His lab work was notable for a mild hyponatremia and he is tolerating oral intake.  He has some slight transaminitis likely secondary to chronic alcohol abuse.  No leukocytosis no anemia.  Nonspecific bilirubin elevation.  No signs of significant alcoholic hepatitis or acute liver failure.  Stable for continued detox at end of shift.    I have reviewed the nursing notes. I have reviewed the findings, diagnosis, plan and need for follow up with the patient.    New Prescriptions    No medications on file       Final diagnoses:   Alcohol withdrawal, uncomplicated (H)   Transaminitis   Elevated bilirubin   Hyponatremia   Hypochloremia       Thomas Davis MD  [facility] EMERGENCY DEPARTMENT  8/4/2023     Thomas Davis MD  08/04/23 4260

## 2023-08-04 NOTE — ED TRIAGE NOTES
States that he had been drinking approx 10 beers per day, last drink was at 10pm  yesterday     Triage Assessment       Row Name 08/04/23 0756       Triage Assessment (Adult)    Airway WDL WDL       Respiratory WDL    Respiratory WDL WDL       Skin Circulation/Temperature WDL    Skin Circulation/Temperature WDL WDL       Cardiac WDL    Cardiac WDL WDL       Peripheral/Neurovascular WDL    Peripheral Neurovascular WDL WDL       Cognitive/Neuro/Behavioral WDL    Cognitive/Neuro/Behavioral WDL WDL

## 2023-08-04 NOTE — PHARMACY-ADMISSION MEDICATION HISTORY
Pharmacist Admission Medication History    Admission medication history is complete. The information provided in this note is only as accurate as the sources available at the time of the update.    Medication reconciliation/reorder completed by provider prior to medication history? No    Information Source(s): Patient via in-person    Pertinent Information: Darion reported he take a medication for heart burn when needed but was not able to recall the name of the medication. Encouraged him to let staff know if he has heart burn.    Changes made to PTA medication list:  Added: melatonin  Deleted: None  Changed: None    Medication History Completed By: Concepcion Giron RPH 8/4/2023 2:54 PM    Prior to Admission medications    Medication Sig Last Dose Taking? Auth Provider Long Term End Date   melatonin 3 MG tablet Take 3 mg by mouth nightly as needed for sleep  Yes Unknown, Entered By History

## 2023-08-05 LAB
ANION GAP SERPL CALCULATED.3IONS-SCNC: 13 MMOL/L (ref 7–15)
BUN SERPL-MCNC: 8.4 MG/DL (ref 6–20)
CALCIUM SERPL-MCNC: 9.2 MG/DL (ref 8.6–10)
CHLORIDE SERPL-SCNC: 97 MMOL/L (ref 98–107)
CHOLEST SERPL-MCNC: 159 MG/DL
CREAT SERPL-MCNC: 1.06 MG/DL (ref 0.67–1.17)
DEPRECATED HCO3 PLAS-SCNC: 26 MMOL/L (ref 22–29)
GFR SERPL CREATININE-BSD FRML MDRD: 85 ML/MIN/1.73M2
GGT SERPL-CCNC: 1287 U/L (ref 8–61)
GLUCOSE SERPL-MCNC: 111 MG/DL (ref 70–99)
HDLC SERPL-MCNC: 35 MG/DL
LDLC SERPL CALC-MCNC: ABNORMAL MG/DL
MAGNESIUM SERPL-MCNC: 1.9 MG/DL (ref 1.7–2.3)
NONHDLC SERPL-MCNC: 124 MG/DL
POTASSIUM SERPL-SCNC: 4 MMOL/L (ref 3.4–5.3)
SODIUM SERPL-SCNC: 136 MMOL/L (ref 136–145)
TRIGL SERPL-MCNC: 447 MG/DL
TSH SERPL DL<=0.005 MIU/L-ACNC: 2.99 UIU/ML (ref 0.3–4.2)

## 2023-08-05 PROCEDURE — 250N000013 HC RX MED GY IP 250 OP 250 PS 637

## 2023-08-05 PROCEDURE — 83735 ASSAY OF MAGNESIUM: CPT

## 2023-08-05 PROCEDURE — 250N000013 HC RX MED GY IP 250 OP 250 PS 637: Performed by: PSYCHIATRY & NEUROLOGY

## 2023-08-05 PROCEDURE — 80061 LIPID PANEL: CPT | Performed by: PSYCHIATRY & NEUROLOGY

## 2023-08-05 PROCEDURE — 80048 BASIC METABOLIC PNL TOTAL CA: CPT

## 2023-08-05 PROCEDURE — G0177 OPPS/PHP; TRAIN & EDUC SERV: HCPCS

## 2023-08-05 PROCEDURE — 250N000013 HC RX MED GY IP 250 OP 250 PS 637: Performed by: STUDENT IN AN ORGANIZED HEALTH CARE EDUCATION/TRAINING PROGRAM

## 2023-08-05 PROCEDURE — 128N000004 HC R&B CD ADULT

## 2023-08-05 PROCEDURE — 99222 1ST HOSP IP/OBS MODERATE 55: CPT | Mod: AI | Performed by: PSYCHIATRY & NEUROLOGY

## 2023-08-05 PROCEDURE — 84443 ASSAY THYROID STIM HORMONE: CPT | Performed by: PSYCHIATRY & NEUROLOGY

## 2023-08-05 PROCEDURE — 99221 1ST HOSP IP/OBS SF/LOW 40: CPT

## 2023-08-05 PROCEDURE — 36415 COLL VENOUS BLD VENIPUNCTURE: CPT | Performed by: PSYCHIATRY & NEUROLOGY

## 2023-08-05 PROCEDURE — 82977 ASSAY OF GGT: CPT | Performed by: PSYCHIATRY & NEUROLOGY

## 2023-08-05 RX ORDER — MAGNESIUM OXIDE 400 MG/1
400 TABLET ORAL DAILY
Status: DISCONTINUED | OUTPATIENT
Start: 2023-08-05 | End: 2023-08-05

## 2023-08-05 RX ADMIN — IBUPROFEN 600 MG: 600 TABLET, FILM COATED ORAL at 10:51

## 2023-08-05 RX ADMIN — HYDROXYZINE HYDROCHLORIDE 25 MG: 25 TABLET, FILM COATED ORAL at 09:15

## 2023-08-05 RX ADMIN — DIAZEPAM 10 MG: 5 TABLET ORAL at 00:59

## 2023-08-05 RX ADMIN — MAGNESIUM OXIDE 400 MG (241.3 MG MAGNESIUM) TABLET 400 MG: TABLET at 10:51

## 2023-08-05 RX ADMIN — FOLIC ACID 1 MG: 1 TABLET ORAL at 08:14

## 2023-08-05 RX ADMIN — THIAMINE HCL TAB 100 MG 100 MG: 100 TAB at 08:14

## 2023-08-05 RX ADMIN — MULTIPLE VITAMINS W/ MINERALS TAB 1 TABLET: TAB at 08:14

## 2023-08-05 ASSESSMENT — ACTIVITIES OF DAILY LIVING (ADL)
ADLS_ACUITY_SCORE: 28
ADLS_ACUITY_SCORE: 28
ADLS_ACUITY_SCORE: 29
ADLS_ACUITY_SCORE: 28
ADLS_ACUITY_SCORE: 28
ADLS_ACUITY_SCORE: 29
DRESS: INDEPENDENT
HYGIENE/GROOMING: INDEPENDENT
ADLS_ACUITY_SCORE: 28
ORAL_HYGIENE: INDEPENDENT
ADLS_ACUITY_SCORE: 29
ADLS_ACUITY_SCORE: 28
LAUNDRY: UNABLE TO COMPLETE

## 2023-08-05 NOTE — H&P
"[facility], [place]   Psychiatric History and Physical  Admission date: 8/4/2023        Chief Complaint:   \"Alcohol\"         HPI:     The patient is a 52yo male with a history of alcohol use disorder who was admitted to detox. He reports that he is \"doing really well.\" Says that he was feeling \"hot and cold\" and had \"a lot of vomiting\" before he came in but feels better now. Slept well with Trazodone last night. Mood is sad as he has had a lot of recent losses. Denies any SI. Denies any current nausea. Open to considering MAT for AUD and CD treatment.      Per ER:  Darion Castrejon is a 53 year old male who presents to the Emergency Department for evaluation of an alcohol problem.  Patient states he has been drinking 6-10 beers daily for the past 10 months.  Previously was drinking hard liquor.  Had stopped drinking hard liquor after the sudden death of his father and best friend from cancer within close temporal proximity.  Has been unable to successfully stop drinking on his own due to withdrawal symptoms.  Denies history of seizures or DTs.  Denies other substances beyond marijuana.  Has not had any successful attempts at sobriety in the past.  Gets withdrawal symptoms such as shakes and tremors.  He denies acute medical concerns such as chest pain shortness of breath fever cough abdominal pain.         Past Psychiatric History:     Denies any history of suicide attempts.         Substance Use and History:     Alcohol use disorder. Denies withdrawal seizures or DTs.   Non-smoker        Past Medical History:   PAST MEDICAL HISTORY: History reviewed. No pertinent past medical history.    PAST SURGICAL HISTORY: History reviewed. No pertinent surgical history.          Family History:   FAMILY HISTORY: Mother with alcohol use disorder and depression.         Social History:   Please see the full psychosocial profile from the clinical treatment coordinator.   SOCIAL HISTORY:   Social History "     Tobacco Use    Smoking status: Never    Smokeless tobacco: Former   Substance Use Topics    Alcohol use: Yes     Comment: 10 beers per day            Physical ROS:   The 10-point review of systems was negative except as noted in HPI.         PTA Medications:     Medications Prior to Admission   Medication Sig Dispense Refill Last Dose    melatonin 3 MG tablet Take 3 mg by mouth nightly as needed for sleep             Allergies:   No Known Allergies       Labs:     Recent Results (from the past 48 hour(s))   Comprehensive metabolic panel    Collection Time: 08/04/23  8:34 AM   Result Value Ref Range    Sodium 130 (L) 136 - 145 mmol/L    Potassium 4.9 3.4 - 5.3 mmol/L    Chloride 92 (L) 98 - 107 mmol/L    Carbon Dioxide (CO2) 27 22 - 29 mmol/L    Anion Gap 11 7 - 15 mmol/L    Urea Nitrogen 6.7 6.0 - 20.0 mg/dL    Creatinine 0.86 0.67 - 1.17 mg/dL    Calcium 9.1 8.6 - 10.0 mg/dL    Glucose 129 (H) 70 - 99 mg/dL    Alkaline Phosphatase 148 (H) 40 - 129 U/L     (H) 0 - 45 U/L    ALT 80 (H) 0 - 70 U/L    Protein Total 6.9 6.4 - 8.3 g/dL    Albumin 3.8 3.5 - 5.2 g/dL    Bilirubin Total 1.8 (H) <=1.2 mg/dL    GFR Estimate >90 >60 mL/min/1.73m2   Magnesium    Collection Time: 08/04/23  8:34 AM   Result Value Ref Range    Magnesium 1.6 (L) 1.7 - 2.3 mg/dL   Ethyl Alcohol Level    Collection Time: 08/04/23  8:34 AM   Result Value Ref Range    Alcohol ethyl <0.01 <=0.01 g/dL   Asymptomatic COVID-19 Virus (Coronavirus) by PCR Nasopharyngeal    Collection Time: 08/04/23  8:34 AM    Specimen: Nasopharyngeal; Swab   Result Value Ref Range    SARS CoV2 PCR Negative Negative   CBC with platelets and differential    Collection Time: 08/04/23  8:34 AM   Result Value Ref Range    WBC Count 5.3 4.0 - 11.0 10e3/uL    RBC Count 4.40 4.40 - 5.90 10e6/uL    Hemoglobin 15.0 13.3 - 17.7 g/dL    Hematocrit 42.7 40.0 - 53.0 %    MCV 97 78 - 100 fL    MCH 34.1 (H) 26.5 - 33.0 pg    MCHC 35.1 31.5 - 36.5 g/dL    RDW 12.7 10.0 - 15.0 %  "   Platelet Count 185 150 - 450 10e3/uL    % Neutrophils 81 %    % Lymphocytes 11 %    % Monocytes 8 %    % Eosinophils 0 %    % Basophils 0 %    % Immature Granulocytes 0 %    NRBCs per 100 WBC 0 <1 /100    Absolute Neutrophils 4.2 1.6 - 8.3 10e3/uL    Absolute Lymphocytes 0.6 (L) 0.8 - 5.3 10e3/uL    Absolute Monocytes 0.4 0.0 - 1.3 10e3/uL    Absolute Eosinophils 0.0 0.0 - 0.7 10e3/uL    Absolute Basophils 0.0 0.0 - 0.2 10e3/uL    Absolute Immature Granulocytes 0.0 <=0.4 10e3/uL    Absolute NRBCs 0.0 10e3/uL   Fentanyl Qual Urine    Collection Time: 08/04/23 10:17 AM   Result Value Ref Range    Fentanyl Qual Urine Screen Negative Screen Negative   Drug abuse screen 1 urine (ED)    Collection Time: 08/04/23 10:17 AM   Result Value Ref Range    Amphetamines Urine Screen Negative Screen Negative    Barbituates Urine Screen Negative Screen Negative    Benzodiazepine Urine Screen Negative Screen Negative    Cannabinoids Urine Screen Positive (A) Screen Negative    Cocaine Urine Screen Negative Screen Negative    Opiates Urine Screen Negative Screen Negative          Physical and Psychiatric Examination:     /78 (BP Location: Right arm, Patient Position: Sitting, Cuff Size: Adult Regular)   Pulse 89   Temp 97.9  F (36.6  C) (Oral)   Resp 18   Ht 1.753 m (5' 9.02\")   Wt 70.3 kg (155 lb)   SpO2 98%   BMI 22.88 kg/m    Weight is 155 lbs 0 oz  Body mass index is 22.88 kg/m .    Physical Exam:  I have reviewed the physical exam as documented by the medical team and agree with findings and assessment and have no additional findings to add at this time.    Mental Status Exam:  Appearance: awake, alert and adequately groomed  Attitude:  cooperative  Eye Contact:  good  Mood:  sad   Affect:  mood congruent  Speech:  clear, coherent  Language: fluent and intact in English  Psychomotor, Gait, Musculoskeletal:  no evidence of tardive dyskinesia, dystonia, or tics  Thought Process:  goal oriented  Associations:  no " loose associations  Thought Content:  no evidence of suicidal ideation or homicidal ideation and no evidence of psychotic thought  Insight:  fair  Judgement:  intact  Oriented to:  time, person, and place  Attention Span and Concentration:  intact  Recent and Remote Memory:  fair  Fund of Knowledge:  appropriate         Admission Diagnoses:      Alcohol use disorder, severe  Alcohol withdrawal with unspecified complication          Assessment & Plan:     1) MSSA with Valium.   2) Patient to be provided information on MAT for AUD.   3) Patient considering CD treatment.     Disposition Plan   Reason for ongoing admission: requires detoxification from substance that poses a risk of bodily harm during withdrawal period  Discharge location: Chemical dependency treatment facility  Discharge Medications: not ordered  Follow-up Appointments: not scheduled  Legal Status: voluntary    Entered by: Delgado Easley MD on 8/5/2023 at 6:00 AM
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
## 2023-08-05 NOTE — PLAN OF CARE
Goal Outcome Evaluation:  MSSA scores of 9/5. Pt receives 10mg of valium for alcohol withdrawal and is observed to sleep throughout the noc.

## 2023-08-05 NOTE — PROGRESS NOTES
08/04/23 2050   Patient Belongings   Did you bring any home meds/supplements to the hospital?  No   Patient Belongings other (see comments)  (storage bin,  med room bin)   Belongings Search Yes   Clothing Search Yes   Second Staff Matt Alegria     Storage bin: shoes, socks, beaded necklace  Med room bin: cell phone, wallet, env #[id number]: [place] ID, [place], check for $410  A               Admission:  I am responsible for any personal items that are not sent to the safe or pharmacy.  [facility] is not responsible for loss, theft or damage of any property in my possession.    Signature:  _________________________________ Date: _______  Time: _____                                              Staff Signature:  ____________________________ Date: ________  Time: _____      2nd Staff person, if patient is unable/unwilling to sign:    Signature: ________________________________ Date: ________  Time: _____     Discharge:  [place] has returned all of my personal belongings:    Signature: _________________________________ Date: ________  Time: _____                                          Staff Signature:  ____________________________ Date: ________  Time: _____

## 2023-08-05 NOTE — PLAN OF CARE
Problem: Plan of Care - These are the overarching goals to be used throughout the patient stay.    Goal: Optimal Comfort and Wellbeing  Outcome: Progressing   Goal Outcome Evaluation:    Plan of Care Reviewed With: patient        Patient with an elevated HR of 118 this AM, rechecked 110 after breakfast, pt asymptomatic, denies SOB or dizziness, eating or drinking adequately, c/o anxiety ,  and slight muscle discomfort, Ibuprofen and Hydroxyzine given , MSSA this shift, 7/3, vitals trending down, HR 88, pt spent shift socializing with staff and watching Tv in lounge.Last Valium was administered at midnight.

## 2023-08-05 NOTE — PLAN OF CARE
"  Problem: Alcohol Withdrawal  Goal: Alcohol Withdrawal Symptom Control  Outcome: Progressing   3AW Admission Note    S = Situation:   Darion Castrejon is a 53 year old year old male with a chief complaint of Addiction Problem (Seeking detox for alcohol abuse)  Reports he was drinking 6-10 beers daily for the last 10 months since the loss of his dad and his friend  from cancer 2 weeks later, he denies hx of seizures,  or DT's.  Voluntary admission to [facility] for Alcohol withdrawal and detox.    B  = Background:   Substance use history: Alcohol  Mental health history: Anxiety & ADD  Medical history: Denies  Legal history:Voluntary   Treatment history: No previous admissions listed  Living situation: Patient is a single dad of 3 boys.  Recent life stressors: Unemployment, lack of insurance, and a strained relationship with his sons.      A  =  Assessment:   During admission interview, pt affect was Calm,   MSSA 4, no withdrawal symptoms noted. Trazodone administered at HS.  BP (!) 147/98 (BP Location: Right arm, Patient Position: Sitting, Cuff Size: Adult Regular)   Pulse 99   Temp 98.6  F (37  C) (Oral)   Resp 18   Ht 1.753 m (5' 9.02\")   Wt 70.3 kg (155 lb)   SpO2 98%   BMI 22.88 kg/m       R =   Request or Recommendation:    withdrawal will be monitored and treated using MSSA with valium Protocol  Pt will meet with psychiatry, internal medicine, and case management tomorrow.  At the time of admission, pt reports discharge plan is  undecided, may decide to go to treatment, not sure at this time.  Goal Outcome Evaluation:    Plan of Care Reviewed With: patient      Pt has no Insurance.                 "

## 2023-08-05 NOTE — CARE PLAN
"Occupational Therapy     08/05/23 1700   Group Therapy Session   Group Attendance attended group session   Time Session Began 1630   Time Session Ended 1730   Total Time (minutes) 60   Total # Attendees 8   Group Type recreation   Group Topic Covered cognitive activities;coping skills/lifestyle management;leisure exploration/use of leisure time;structured socialization   Group Session Detail OT: Education cognitive wellness and interactive social activity (Scattegories) to increase concentration, focus, attention to task/detail, task follow through, frustration tolerance, memory recall, healthy leisure engagement, coping with stress, heathy distraction engagement, cognitive wellness, social wellness, and social engagement   Patient Response/Contribution cooperative with task;listened actively;other (see comments)  (pleasant; engaged)   Patient Participation Detail Pt reported during check-in he enjoys \"music and gardening\" as ways to support his cognitive wellness. Pt sat among peers to complete presented activity and engaged in reciprocal social interactions with peers. Pt able to follow verbal directions for novel activity, despite expressing self-doubt about his ability to engage in the activity. Pt able to identify several unique responses. Pt reported he enjoyed the activity and verbalized understanding of importance of cognitive wellness in a healthy lifestyle.        "

## 2023-08-05 NOTE — DISCHARGE INSTRUCTIONS
Behavioral Discharge Planning and Instructions  THANK YOU FOR CHOOSING THE [facility]  3A  [phone]    Summary: You were admitted to Station 3A on 8/4/23 for detoxification from Alcohol.  A medical exam was performed that included lab work. You have met with a  and opted to Detox Only.  Please take care and make your recovery a priority, Darion!    Recommendation:  Please seek a CD assessment as it will be helpful for your recovery if you wish to receive inpatient or outpatient treatment. Please follow any and all recommendations as needed and required. Please follow up with AA meetings and also look into -SMART Recovery - self management for addiction recovery:  www.smartrecovery.org      Main Diagnosis: Per Dr. Delgado Easley MD  Alcohol use disorder, severe  Alcohol withdrawal with unspecified complication       Major Treatments, Procedures and Findings:  You have withdrawn from Alcohol using Valium. You have met with a  to develop a treatment plan for discharge.  You have had labs drawn and those results have been reviewed with you. Please take a copy of your lab work with you to your next primary care physician appointment.  Major Treatments, Procedures and Findings:  You were detoxed from alcohol with the Modified Selective Severity Protocol using Valium. You have met with a  to develop a treatment plan for discharge.  You have had labs drawn and a copy of those labs will be sent home with you.  Please bring your lab results with to your follow up doctor appointment.    Symptoms to Report:  If you experience more anxiety, confusion, sleeplessness, deep sadness or thoughts of suicide, notify your treatment team or notify your primary care physician. IF ANY OF THE SYMPTOMS YOU ARE EXPERIENCING ARE A MEDICAL EMERGENCY CALL 911 IMMEDIATELY.     If you or someone you know is struggling or in crisis, help is available.  Call or text 108 or chat  at  "988InVisioneerline.org    Lifestyle Adjustment: Adjust your lifestyle to get enough sleep, relaxation, exercise and  good nutrition. Continue to develop healthy coping skills to decrease stress and promote a sober living environment. Do not use alcohol, illegal drugs or addictive medications other than what is currently prescribed. AA, NA, and  Sponsor are excellent resources for support.     Primary Provider:    Disposition: Home.      Facts about COVID19 at www.cdc.gov/COVID19 and www.[place].gov/covid19    Keeping hands clean is one of the most important steps we can take to avoid getting sick and spreading germs to others.  Please wash your hands frequently and lather with soap for at least 20 seconds!    Follow-up Appointment:   PCP: [facility]   Location: [id number] [facility] [place] [address]  Date 8/10/23  Time 10:40 AM    Patient declines all mental health appointments and resources from case management.     Resources:     Resources for on line recovery meetings:  AA meetings can be found online; search for them at: http://aa-intergroup.org/directory.php  AA meetings via [place] for [place] area can be found online at: https://a[place].org/find-a-meeting/holiday-closings/  NA meetings via [place] for [place] area can be found online at: https://sites.Gridium.com/view/[place]regionofnarcoticsanonymous/home?authuser=2    Www.Visante  has online resources for meeting and recovery care including Podcast \"Let's Talk:Addiction & Recovery Podcasts    Www.[place].org     DISCHARGE RESOURCES:  -SMART Recovery - self management for addiction recovery:  www.smartrecovery.org    -Pathways ~ A Health Crisis Resource & Support Center: 816.646.9157.  -[facility] [phone]   -[facility] 428-949-0654 or 845-287-9311.  -Suicide Awareness Voices of Education (SAVE) (www.save.org): 756-260-IQOY (3813)  -National Suicide Prevention Line (www.mentalhealthmn.org): 838-121-ROUN " (7952)  -National [place] on Mental Illness (www.[place].naresh.org): 525.231.8839 or 777-938-1980.  -Hjty8bdmv: text the word LIFE to 16097 for immediate support and crisis intervention  -Mental Health Consumer/Survivor Network of [place] (www.mhcsn.net): 512.217.7545 or 006-516-0699  -Mental Health Association of [place] (www.mentalhealth.org): 912.988.4105 or 308-702-0792     -Substance Abuse and Mental Health Services (www.samhsa.gov)  -Harm Reduction Coalition (www. Harmreduction.org)  -www.prescribetoprevent.org or http://prescribetoprevent.org/video  -Poison control 0-502-950-8389   [place] Opioid Prevention Coalition: www.opioidcoalition.org    Sober Support Group Information:  AA/NA & Sponsor/Support  -Alcoholics Anonymous (www.alcoholics-anonymous.org): for local information 24 hours/day  -AA Intergroup service office in [place] (http://www.aastpaul.org/) 160.814.2555  -AA Intergroup service office in [facility]: [phone]. (http://www.a[place].org/)  -Narcotics Anonymous (www.naminn[place].org) (753) 454-8799   Sober Fun Activities: www.soberWineristactivities.Adaptics.SolarWinds/[facility]//[facility] Recovery Connection ([facility])  [facility] connects people seeking recovery to resources that help foster and sustain long-term recovery.  Whether you are seeking resources for treatment, transportation, housing, job training, education, health care or other pathways to recovery, [facility] is a great place to start.    Phone: (745) 582-3070.  www.[place]Bux180.org (Great listing of all types of recovery and non-recovery related resources)      General Medication Instructions:   See your medication sheet(s) for instructions.   Take all medicines as directed.  Make no changes unless your doctor suggests them.   Go to all your doctor visits.  Be sure to have all your required lab tests. This way, your medicines can be refilled on time.  Do not use any drugs not prescribed by your provider.  AA/NA and Sponsors are excellent resources  for support  Avoid alcohol.    Any follow up concerns:  Nursing questions call the [facility] [phone]  Medical Record call [phone]  Outpatient Behavioral Intake call [phone]  LP+ Wait List/Bed Availability call [phone]    The entire treatment team has appreciated the opportunity to work with you Darion.  We wish you the best in the future and with your lifelong recovery goals. Please bring this discharge folder with you to all follow up appointments.  It contains your lab results, diagnosis, medication list and discharge recommendations.    THANK YOU FOR CHOOSING THE [facility]

## 2023-08-05 NOTE — CONSULTS
"[facility]  Consult Note - Hospitalist Service  Date of Admission:  8/4/2023  Consult Requested by: Psychiatry; Dr. Delgado Easley  Reason for Consult: \"detox\"    Assessment & Plan   Darion Castrejon is a 53 year old male with PMH significant for alcohol use disorder, anxiety, elevated liver enzymes admitted on 8/4/2023 for alcohol detox.    #Alcohol use disorder  #Acute alcohol withdrawal  Drinking 6-10 beers x10 months related to the death of his father and close friend. Last drink 2200 on 8/3. Alcohol level <0.01. No reported hx of w/d seizures or DTs. Utox positive for cannabinoids.   - MSSA w/ diazepam  - supplements  - management per psychiatry    #Anxiety  - management per psychiatry    #Hyponatremia  Na 130 on arrival. Prior CMP 9mo ago w/ Na of 136. No confusion, no headaches. Does endorse significant water intake each day. Repeat BMP on 8/5 w/ Na of 136.   - no further workup or treatment indicated  - monitor patient for development of acute confusion/AMS and new onset HA  - follow up with PCP in 1-4 weeks of discharge for repeat BMP to ensure normal Na and other lytes    #Hypomagnesemia  Mg 1.6 on arrival. Repeat Mg of 1.9.  - follow up with PCP w/in 1-4 weeks of discharge for repeat Mg to ensure it remains normal    #Transaminitis  #Elevated alk phos  #GGT  #Alcoholic liver disease  , ALT 80, alk phos 148. AST:ALT ratio = 2. GGT 1.287. These elevations are consistent with chronic alcohol use.   - alcohol cessation  - follow up with PCP w/in 1-4 wks of discharge to discuss need for repeat and other monitoring needed    #Hypertriglyceridemia  447 on arrival. Current 10-year ASCVD risk of 4.2%, low. First line is lifestyle modification.   - follow up with PCP within 1-4 weeks of discharge for repeat fasting lipid panel and discussion of lifestyle modifications for optimization of triglyceride levels     The patient's care was discussed with the " Patient.    Thank you for allowing us to participate in the care of this patient. Medicine will sign off at this point. Please do not hesitate to reach out with further questions and concerns.       Clinically Significant Risk Factors Present on Admission            # Hypomagnesemia: Lowest Mg = 1.6 mg/dL in last 2 days, will replace as needed                     Marquise Hdz PA-C  Hospitalist Service  Securely message with [employer] (more info)  Text page via [facility] Paging/Directory   ______________________________________________________________________    Chief Complaint   Alcohol detox    History is obtained from the patient, chart review    History of Present Illness   Darion Castrejon is a 53 year old male with PMH significant for alcohol use disorder, anxiety, elevated liver enzymes who presented to the ED on 8/4 for alcohol detoxification. Pt reports drinking approximately 6-10 beers daily for the past 10 months. Prior to this, was drinking hard liquor, but stopped drinking liquor after death of his father and close friend. Has not been able to stop drinking on his own.   Denies hx of DTs and w/d sz.   Currently, states he is feeling well, denies current withdrawal symptoms. States he is eating and drinking well. Pt has custody of 2 sons, aged 17 and 19, who live at home with him. Also close with his mother who is also a chronic drinker. She became sober about 2 weeks ago and he feels confident that they can help each other stay sober. He feels well supported and is excited to get home to his family.  Pt denies chest pain, SOB, N/V, abdominal pain, hallucinations, other concerns.         Past Medical History    History reviewed. No pertinent past medical history.    Past Surgical History   History reviewed. No pertinent surgical history.    Medications   I have reviewed this patient's current medications  Current Facility-Administered Medications   Medication    alum & mag hydroxide-simethicone (MAALOX)  suspension 30 mL    diazepam (VALIUM) tablet 5-20 mg    folic acid (FOLVITE) tablet 1 mg    hydrOXYzine (ATARAX) tablet 25 mg    ibuprofen (ADVIL/MOTRIN) tablet 600 mg    loperamide (IMODIUM) capsule 2 mg    multivitamin w/minerals (THERA-VIT-M) tablet 1 tablet    ondansetron (ZOFRAN ODT) ODT tab 4 mg    senna-docusate (SENOKOT-S/PERICOLACE) 8.6-50 MG per tablet 1 tablet    thiamine (B-1) tablet 100 mg    traZODone (DESYREL) tablet 50 mg          Social History   I have reviewed this patient's social history and updated it with pertinent information if needed.  Social History     Tobacco Use    Smoking status: Never    Smokeless tobacco: Former   Substance Use Topics    Alcohol use: Yes     Comment: 10 beers per day    Drug use: Yes     Types: Marijuana     Father of 3 boys, has custody of 2 (ages 17 and 19).    Family History           Allergies   No Known Allergies  ------------------------------------------------------------------------     Physical Exam   Vital Signs: Temp: 97.6  F (36.4  C) Temp src: Temporal BP: (!) 127/92 Pulse: 118   Resp: 16 SpO2: 99 % O2 Device: None (Room air)    Weight: 155 lbs 0 oz    General Appearance: Patient is well appearing, NAD.   Respiratory: Lungs CTAB, breathing comfortably on room air.   Cardiovascular: RRR, no MRG noted.   GI: Abdomen soft, non distended. Bowel sounds appreciated.   Skin: Warm, dry. No evident abnormalities noted on gross examination of exposed skin.   Psych: Pt is alert and oriented. Cooperative and interactive with provider. Pleasant mood, appropriate affect.      Medical Decision Making       45 MINUTES SPENT BY ME on the date of service doing chart review, history, exam, documentation & further activities per the note.      Data   ------------------------- PAST 24 HR DATA REVIEWED -----------------------------------------------    I have personally reviewed the following data over the past 24 hrs:    N/A  \   N/A   / N/A     N/A N/A N/A /  N/A   N/A N/A  N/A \     ALT: N/A AST: N/A AP: N/A TBILI: N/A   ALB: N/A TOT PROTEIN: N/A LIPASE: N/A       Imaging results reviewed over the past 24 hrs:   No results found for this or any previous visit (from the past 24 hour(s)).  Recent Labs   Lab 08/04/23  0834   WBC 5.3   HGB 15.0   MCV 97      *   POTASSIUM 4.9   CHLORIDE 92*   CO2 27   BUN 6.7   CR 0.86   ANIONGAP 11   TOÑA 9.1   *   ALBUMIN 3.8   PROTTOTAL 6.9   BILITOTAL 1.8*   ALKPHOS 148*   ALT 80*   *

## 2023-08-06 PROCEDURE — 128N000004 HC R&B CD ADULT

## 2023-08-06 PROCEDURE — 250N000013 HC RX MED GY IP 250 OP 250 PS 637: Performed by: PSYCHIATRY & NEUROLOGY

## 2023-08-06 PROCEDURE — 250N000013 HC RX MED GY IP 250 OP 250 PS 637: Performed by: STUDENT IN AN ORGANIZED HEALTH CARE EDUCATION/TRAINING PROGRAM

## 2023-08-06 RX ADMIN — IBUPROFEN 600 MG: 600 TABLET, FILM COATED ORAL at 08:18

## 2023-08-06 RX ADMIN — ALUMINUM HYDROXIDE, MAGNESIUM HYDROXIDE, AND SIMETHICONE 30 ML: 200; 200; 20 SUSPENSION ORAL at 17:37

## 2023-08-06 RX ADMIN — HYDROXYZINE HYDROCHLORIDE 25 MG: 25 TABLET, FILM COATED ORAL at 12:23

## 2023-08-06 RX ADMIN — FOLIC ACID 1 MG: 1 TABLET ORAL at 08:15

## 2023-08-06 RX ADMIN — HYDROXYZINE HYDROCHLORIDE 25 MG: 25 TABLET, FILM COATED ORAL at 16:53

## 2023-08-06 RX ADMIN — THIAMINE HCL TAB 100 MG 100 MG: 100 TAB at 08:15

## 2023-08-06 RX ADMIN — MULTIPLE VITAMINS W/ MINERALS TAB 1 TABLET: TAB at 08:15

## 2023-08-06 ASSESSMENT — ACTIVITIES OF DAILY LIVING (ADL)
ADLS_ACUITY_SCORE: 29
HYGIENE/GROOMING: INDEPENDENT
ADLS_ACUITY_SCORE: 29
DRESS: INDEPENDENT
ORAL_HYGIENE: INDEPENDENT
ADLS_ACUITY_SCORE: 29

## 2023-08-06 NOTE — PLAN OF CARE
Behavioral Team Discussion: (8/6/2023)    Continued Stay Criteria/Rationale: Patient admitted for  Alcohol Use Disorder.  Plan: The following services will be provided to the patient; psychiatric assessment, medication management, therapeutic milieu, individual and group support, and skills groups.   Participants: 3A Provider: Dr. Delgado Easley MD; 3A RN: Carolyn Liriano, RN; 3A CM's: Luana Waller.  Summary/Recommendation: Providers will assess today for treatment recommendations, discharge planning, and aftercare plans. CM will meet with pt for discharge planning.   Medical/Physical: Patient denies any medical or physical concerns at this time.  Precautions:   Behavioral Orders   Procedures    Code 1 - Restrict to Unit    Routine Programming     As clinically indicated    Status 15     Every 15 minutes.    Withdrawal precautions     Rationale for change in precautions or plan: N/A  Progress: Initial.    ASAM Dimension Scale Ratings:  Dimension 1: 3 Client tolerates and clifton with withdrawal discomfort poorly. Client has severe intoxication, such that the client endangers self or others, or intoxication has not abated with less intensive levels of services. Client displays severe signs and symptoms; or risk of severe, but manageable withdrawal; or withdrawal worsening despite detox at less intensive level.  Dimension 2: 1 Client tolerates and clifton with physical discomfort and is able to get the services that the client needs.  Dimension 3: 2 Client has difficulty with impulse control and lacks coping skills. Client has thoughts of suicide or harm to others without means; however, the thoughts may interfere with participation in some treatment activities. Client has difficulty functioning in significant life areas. Client has moderate symptoms of emotional, behavioral, or cognitive problems. Client is able to participate in most treatment activities.  Dimension 4: 3 Client displays inconsistent compliance, minimal  awareness of either the client's addiction or mental disorder, and is minimally cooperative.  Dimension 5: 3 Client has poor recognition and understanding of relapse and recidivism issues and displays moderately high vulnerability for further substance use or mental health problems. Client has few coping skills and rarely applies coping skills.  Dimension 6: 3 Client is not engaged in structured, meaningful activity and the client's peers, family, significant other, and living environment are unsupportive, or there is significant criminal justice system involvement.

## 2023-08-06 NOTE — PLAN OF CARE
Goal Outcome Evaluation:  MSSA score of 4, patient did not require medication for alcohol withdrawal and is observed to sleep throughout the noc.

## 2023-08-06 NOTE — PLAN OF CARE
Goal Outcome Evaluation:    Plan of Care Reviewed With: patient         Problem: Plan of Care - These are the overarching goals to be used throughout the patient stay.    Goal: Optimal Comfort and Wellbeing  8/5/2023 1957 by Carolyn Liriano, RN  Outcome: Progressing  8/5/2023 1447 by Carolyn Liriano, RN  Outcome: Progressing   Patient visible and engaged on the unit, MSSA this evening 4/2 nimo stable, last valium given at [facility] yesterday, attended  AA and social group on the unit, denies discomfort, spent time making phone calls, and watching Tv, no SI/HI. Magnesium 400mg given for Hypomagnesemia. States he is feeling much improved.     Discharge plan is to attend AA with his two sober friends.

## 2023-08-06 NOTE — PROGRESS NOTES
Writer followed up with patient regarding his plans for discharging, patient stated his plan is still the same to continue his AA meetings and get support from his 2 sober friends that lives around his residence.

## 2023-08-06 NOTE — PLAN OF CARE
Problem: Adult Behavioral Health Plan of Care  Goal: Plan of Care Review  Recent Flowsheet Documentation  Taken 8/6/2023 1200 by Carolyn Liriano RN  Patient Agreement with Plan of Care: agrees   Goal Outcome Evaluation:    Plan of Care Reviewed With: patient        Patient slightly anxious and requesting to eat in his room for lunch, /97, denies headache, dizziness or SOB, MSSA this shift 4/3, hydroxyzine 25mg as needed for anxiety, no SI/HI, encouraged to utilize coping strategies.  Rechecked Blood pressure trending down to 134/91, no concerns.

## 2023-08-07 VITALS
WEIGHT: 155 LBS | TEMPERATURE: 97.1 F | DIASTOLIC BLOOD PRESSURE: 94 MMHG | HEART RATE: 93 BPM | RESPIRATION RATE: 16 BRPM | SYSTOLIC BLOOD PRESSURE: 128 MMHG | HEIGHT: 69 IN | BODY MASS INDEX: 22.96 KG/M2 | OXYGEN SATURATION: 98 %

## 2023-08-07 PROCEDURE — 250N000013 HC RX MED GY IP 250 OP 250 PS 637: Performed by: PSYCHIATRY & NEUROLOGY

## 2023-08-07 PROCEDURE — 99239 HOSP IP/OBS DSCHRG MGMT >30: CPT | Performed by: PSYCHIATRY & NEUROLOGY

## 2023-08-07 PROCEDURE — 250N000013 HC RX MED GY IP 250 OP 250 PS 637: Performed by: STUDENT IN AN ORGANIZED HEALTH CARE EDUCATION/TRAINING PROGRAM

## 2023-08-07 RX ORDER — MULTIPLE VITAMINS W/ MINERALS TAB 9MG-400MCG
1 TAB ORAL DAILY
Qty: 30 TABLET | Refills: 1 | Status: SHIPPED | OUTPATIENT
Start: 2023-08-07

## 2023-08-07 RX ORDER — ACAMPROSATE CALCIUM 333 MG/1
666 TABLET, DELAYED RELEASE ORAL 3 TIMES DAILY
Qty: 180 TABLET | Refills: 3 | Status: SHIPPED | OUTPATIENT
Start: 2023-08-07

## 2023-08-07 RX ORDER — FOLIC ACID 1 MG/1
1 TABLET ORAL DAILY
Qty: 30 TABLET | Refills: 1 | Status: SHIPPED | OUTPATIENT
Start: 2023-08-07

## 2023-08-07 RX ORDER — HYDROXYZINE HYDROCHLORIDE 25 MG/1
25 TABLET, FILM COATED ORAL EVERY 4 HOURS PRN
Qty: 60 TABLET | Refills: 1 | Status: SHIPPED | OUTPATIENT
Start: 2023-08-07

## 2023-08-07 RX ORDER — LANOLIN ALCOHOL/MO/W.PET/CERES
100 CREAM (GRAM) TOPICAL DAILY
Qty: 30 TABLET | Refills: 0 | Status: SHIPPED | OUTPATIENT
Start: 2023-08-07

## 2023-08-07 RX ADMIN — FOLIC ACID 1 MG: 1 TABLET ORAL at 09:12

## 2023-08-07 RX ADMIN — THIAMINE HCL TAB 100 MG 100 MG: 100 TAB at 09:12

## 2023-08-07 RX ADMIN — HYDROXYZINE HYDROCHLORIDE 25 MG: 25 TABLET, FILM COATED ORAL at 00:40

## 2023-08-07 RX ADMIN — MULTIPLE VITAMINS W/ MINERALS TAB 1 TABLET: TAB at 09:12

## 2023-08-07 RX ADMIN — ALUMINUM HYDROXIDE, MAGNESIUM HYDROXIDE, AND SIMETHICONE 30 ML: 200; 200; 20 SUSPENSION ORAL at 11:33

## 2023-08-07 ASSESSMENT — ACTIVITIES OF DAILY LIVING (ADL)
ORAL_HYGIENE: INDEPENDENT
ADLS_ACUITY_SCORE: 29
DRESS: INDEPENDENT
ADLS_ACUITY_SCORE: 29
ADLS_ACUITY_SCORE: 29
LAUNDRY: UNABLE TO COMPLETE
ADLS_ACUITY_SCORE: 29
HYGIENE/GROOMING: INDEPENDENT
ADLS_ACUITY_SCORE: 29

## 2023-08-07 NOTE — PLAN OF CARE
Problem: Plan of Care - These are the overarching goals to be used throughout the patient stay.    Goal: Readiness for Transition of Care  Outcome: Progressing   Goal Outcome Evaluation:    Plan of Care Reviewed With: patient    [facility] [employer] [facility], with peers,   more engaged this afternoon, MSSA 6/5, c/o GI discomfort before meals, Maalox effective, slightly irritable after talking on the phone, Hydroxyzine helpful BP elevated at 158/100, trending down after meds to 130/95, will be out of detox at [facility] and hoping to discharge in the AM to home.

## 2023-08-07 NOTE — PROGRESS NOTES
Triage & Transition Services, [address]     Writer met with patient about discharge planning. Patient remains interested in detox only at this time. He will start AA and look into Smart Recovery. Writer encouraged patient to calling Billing Office again to set up insurance before discharging. Patient declines all mental health appointments and resources from case management.     Plan: Detox Only    Bethanie Gabriel  Triage & Transition Services - Mental Health and Addiction Service Line  [address] - Adult Inpatient Addiction Psychiatry Unit

## 2023-08-07 NOTE — PROGRESS NOTES
You were detoxed from alcohol with the Modified Selective Severity Protocol using  valium.  You have met with a  to develop a treatment plan for discharge.  You have had labs drawn and a copy of those labs will be sent home with you. Your alcohol withdrawal is complete and you are being discharged today.  Please bring your lab results with to your follow up doctor appointment.  Make your recovery a priority!

## 2023-08-07 NOTE — DISCHARGE SUMMARY
"Psychiatric Discharge Summary    Darion Castrejon MRN# [id number]   Age: 53 year old YOB: 1970     Date of Admission:  8/4/2023  Date of Discharge:  8/7/2023  1:40 PM  Admitting Physician:  Delgado Easley MD  Discharge Physician:  Delgado Easley MD          Event Leading to Hospitalization:   The patient is a 54yo male with a history of alcohol use disorder who was admitted to detox. He reports that he is \"doing really well.\" Says that he was feeling \"hot and cold\" and had \"a lot of vomiting\" before he came in but feels better now. Slept well with Trazodone last night. Mood is sad as he has had a lot of recent losses. Denies any SI. Denies any current nausea. Open to considering MAT for AUD and CD treatment.          See Admission note by Delgado Easley MD for additional details.          Diagnoses:     Alcohol use disorder, severe  Alcohol withdrawal with unspecified complication          Labs:        Lab Results   Component Value Date     08/05/2023    Lab Results   Component Value Date    CHLORIDE 97 08/05/2023    CHLORIDE 103 10/11/2022    Lab Results   Component Value Date    BUN 8.4 08/05/2023    BUN 10 10/11/2022      Lab Results   Component Value Date    POTASSIUM 4.0 08/05/2023    POTASSIUM 3.2 10/11/2022    Lab Results   Component Value Date    CO2 26 08/05/2023    CO2 25 10/11/2022    Lab Results   Component Value Date    CR 1.06 08/05/2023          Lab Results   Component Value Date    WBC 5.3 08/04/2023    HGB 15.0 08/04/2023    HCT 42.7 08/04/2023    MCV 97 08/04/2023     08/04/2023     Lab Results   Component Value Date     (H) 08/04/2023    ALT 80 (H) 08/04/2023    GGT 1,287 (H) 08/05/2023    ALKPHOS 148 (H) 08/04/2023    BILITOTAL 1.8 (H) 08/04/2023     Lab Results   Component Value Date    TSH 2.99 08/05/2023            Consults:   Consultation during this admission received from internal medicine:  Darion Castrejon is a 53 year old male with PMH " significant for alcohol use disorder, anxiety, elevated liver enzymes admitted on 8/4/2023 for alcohol detox.     #Alcohol use disorder  #Acute alcohol withdrawal  Drinking 6-10 beers x10 months related to the death of his father and close friend. Last drink 2200 on 8/3. Alcohol level <0.01. No reported hx of w/d seizures or DTs. Utox positive for cannabinoids.   - MSSA w/ diazepam  - supplements  - management per psychiatry     #Anxiety  - management per psychiatry     #Hyponatremia  Na 130 on arrival. Prior CMP 9mo ago w/ Na of 136. No confusion, no headaches. Does endorse significant water intake each day. Repeat BMP on 8/5 w/ Na of 136.   - no further workup or treatment indicated  - monitor patient for development of acute confusion/AMS and new onset HA  - follow up with PCP in 1-4 weeks of discharge for repeat BMP to ensure normal Na and other lytes     #Hypomagnesemia  Mg 1.6 on arrival. Repeat Mg of 1.9.  - follow up with PCP w/in 1-4 weeks of discharge for repeat Mg to ensure it remains normal     #Transaminitis  #Elevated alk phos  #GGT  #Alcoholic liver disease  , ALT 80, alk phos 148. AST:ALT ratio = 2. GGT 1.287. These elevations are consistent with chronic alcohol use.   - alcohol cessation  - follow up with PCP w/in 1-4 wks of discharge to discuss need for repeat and other monitoring needed     #Hypertriglyceridemia  447 on arrival. Current 10-year ASCVD risk of 4.2%, low. First line is lifestyle modification.   - follow up with PCP within 1-4 weeks of discharge for repeat fasting lipid panel and discussion of lifestyle modifications for optimization of triglyceride levels         Hospital Course:   Darion Castrejon was admitted to [facility] with attending Delgado Easley MD as a voluntary patient. The patient was placed under status 15 (15 minute checks) to ensure patient safety.   MSSA protocol was initiated due to the patient's history of alcohol abuse and concern for withdrawal  symptoms.  CBC, BMP and utox obtained.    All outpatient medications were continued. Campral was provided at discharge.     Darion Castrejon did participate in groups and was visible in the milieu.     The patient's symptoms of withdrawal improved.     Darion Castrejon was released to home. At the time of discharge Darion Castrejon was determined to not be a danger to himself or others. At the current time of discharge, the patient does not meet criteria for involuntary hospitalization. On the day of discharge, the patient reports that they do not have suicidal or homicidal ideation and would never hurt themselves or others. Steps taken to minimize risk include: assessing patient s behavior and thought process daily during hospital stay, discharging patient with adequate plan for follow up for mental and physical health and discussing safety plan of returning to the hospital should the patient ever have thoughts of harming themselves or others. Therefore, based on all available evidence including the factors cited above, the patient does not appear to be at imminent risk for self-harm, and is appropriate for outpatient level of care.           Discharge Medications:     Current Discharge Medication List        START taking these medications    Details   acamprosate (CAMPRAL) 333 MG EC tablet Take 2 tablets (666 mg) by mouth 3 times daily  Qty: 180 tablet, Refills: 3    Associated Diagnoses: Alcohol withdrawal, with unspecified complication (H)      folic acid (FOLVITE) 1 MG tablet Take 1 tablet (1 mg) by mouth daily  Qty: 30 tablet, Refills: 1    Associated Diagnoses: Alcohol withdrawal, with unspecified complication (H)      hydrOXYzine (ATARAX) 25 MG tablet Take 1 tablet (25 mg) by mouth every 4 hours as needed for anxiety  Qty: 60 tablet, Refills: 1    Associated Diagnoses: Alcohol withdrawal, with unspecified complication (H)      multivitamin w/minerals (THERA-VIT-M) tablet Take 1 tablet by mouth daily  Qty: 30 tablet,  Refills: 1    Associated Diagnoses: Alcohol withdrawal, with unspecified complication (H)      thiamine (B-1) 100 MG tablet Take 1 tablet (100 mg) by mouth daily  Qty: 30 tablet, Refills: 0    Associated Diagnoses: Alcohol withdrawal, with unspecified complication (H)           CONTINUE these medications which have NOT CHANGED    Details   melatonin 3 MG tablet Take 3 mg by mouth nightly as needed for sleep                  Psychiatric Examination:   Appearance:  awake, alert and adequately groomed  Attitude:  cooperative  Eye Contact:  good  Mood:  better  Affect:  mood congruent  Speech:  clear, coherent  Psychomotor Behavior:  no evidence of tardive dyskinesia, dystonia, or tics  Thought Process:  goal oriented  Associations:  no loose associations  Thought Content:  no evidence of suicidal ideation or homicidal ideation and no evidence of psychotic thought  Insight:  fair  Judgment:  fair  Oriented to:  time, person, and place  Attention Span and Concentration:  intact  Recent and Remote Memory:  fair  Language: Able to read and write  Fund of Knowledge: appropriate  Muscle Strength and Tone: normal  Gait and Station: Normal         Discharge Plan:   Continue medications as above.     Major Treatments, Procedures and Findings:  You were detoxed from alcohol with the Modified Selective Severity Protocol using Valium. You have met with a  to develop a treatment plan for discharge.  You have had labs drawn and a copy of those labs will be sent home with you.  Please bring your lab results with to your follow up doctor appointment.     Symptoms to Report:  If you experience more anxiety, confusion, sleeplessness, deep sadness or thoughts of suicide, notify your treatment team or notify your primary care physician. IF ANY OF THE SYMPTOMS YOU ARE EXPERIENCING ARE A MEDICAL EMERGENCY CALL 911 IMMEDIATELY.      If you or someone you know is struggling or in crisis, help is available.  Call or text 282 or  chat  at [email or web address]     Lifestyle Adjustment: Adjust your lifestyle to get enough sleep, relaxation, exercise and  good nutrition. Continue to develop healthy coping skills to decrease stress and promote a sober living environment. Do not use alcohol, illegal drugs or addictive medications other than what is currently prescribed. AA, NA, and  Sponsor are excellent resources for support.      Disposition: Home.        Facts about COVID19 at www.cdc.gov/COVID19 and www.[place].gov/covid19     Keeping hands clean is one of the most important steps we can take to avoid getting sick and spreading germs to others.  Please wash your hands frequently and lather with soap for at least 20 seconds!     Follow-up Appointment:   PCP: [facility]   Location: [id number] [facility] [place] [address]  Date 8/10/23  Time 10:40 AM       Attestation:    The patient was seen and evaluated by me. I spent more than 30 minutes on discharge day activities. Delgado Easley MD

## 2023-08-07 NOTE — PLAN OF CARE
"Goal Outcome Evaluation:    Plan of Care Reviewed With: patient          Pt denies anxiety, depression, HI, SI, SIB, AVH, and pain. He ate 100% of breakfast and 50% of lunch. He was polite, calm, and cooperative.He received PRN maalox for indigestion. He is social with peers. He made a follow up PCP appointment. He was encouraged to follow up with billing about insurance but was unable to due to not having  working phone number. He plans to address this within a week after discharge.     BP (!) 128/94 (BP Location: Left arm)   Pulse 93   Temp 97.1  F (36.2  C) (Temporal)   Resp 16   Ht 1.753 m (5' 9.02\")   Wt 70.3 kg (155 lb)   SpO2 98%   BMI 22.88 kg/m             "

## 2023-08-07 NOTE — PLAN OF CARE
Problem: Alcohol Withdrawal  Goal: Alcohol Withdrawal Symptom Control  Outcome: Progressing     Problem: Sleep Disturbance  Goal: Adequate Sleep/Rest  Outcome: Progressing   Goal Outcome Evaluation:    The Pt slept okay, and his MSSA score was 4; he received no Valium this night. The fifteen minutes safety checks are in progress with no related issues. He took prn Vistaril for anxiety due to a bad dream. He last took Valium on 8/6/23 at 0059. He is removed from detox.

## 2023-08-07 NOTE — PROGRESS NOTES
SPIRITUAL HEALTH SERVICES Progress Note  [facility] ([facility]) 3A    Saw pt Darion per self-initiated length of stay visit and oriented him to [facility]. Darion declined a visit at this time and said he identifies as Anabaptist, has family support and was planning on using that to help him reconnect to his spiritual life. He asked if he could see a  at a later time/day if he changed his mind. I told him he could always request for a  via his nurse at anytime. [facility] remains available per pt request.    Santi De Jesus, CPRS, CHP   Intern  Pager [phone]      * [facility] remains available 24/7 for emergent requests/referrals, either by having the switchboard page the on-call  or by entering an ASAP/STAT consult in Epic (this will also page the on-call ). Routine Epic consults receive an initial response within 24 hours.*